# Patient Record
Sex: FEMALE | Race: ASIAN | NOT HISPANIC OR LATINO
[De-identification: names, ages, dates, MRNs, and addresses within clinical notes are randomized per-mention and may not be internally consistent; named-entity substitution may affect disease eponyms.]

---

## 2018-12-26 ENCOUNTER — TRANSCRIPTION ENCOUNTER (OUTPATIENT)
Age: 53
End: 2018-12-26

## 2019-03-07 ENCOUNTER — TRANSCRIPTION ENCOUNTER (OUTPATIENT)
Age: 54
End: 2019-03-07

## 2019-03-22 ENCOUNTER — APPOINTMENT (OUTPATIENT)
Dept: GASTROENTEROLOGY | Facility: CLINIC | Age: 54
End: 2019-03-22
Payer: MEDICAID

## 2019-03-22 VITALS
SYSTOLIC BLOOD PRESSURE: 121 MMHG | HEIGHT: 63 IN | TEMPERATURE: 97.9 F | BODY MASS INDEX: 23.04 KG/M2 | DIASTOLIC BLOOD PRESSURE: 80 MMHG | WEIGHT: 130 LBS | HEART RATE: 56 BPM | OXYGEN SATURATION: 96 %

## 2019-03-22 DIAGNOSIS — Z00.00 ENCOUNTER FOR GENERAL ADULT MEDICAL EXAMINATION W/OUT ABNORMAL FINDINGS: ICD-10-CM

## 2019-03-22 DIAGNOSIS — Z84.89 FAMILY HISTORY OF OTHER SPECIFIED CONDITIONS: ICD-10-CM

## 2019-03-22 DIAGNOSIS — Z12.11 ENCOUNTER FOR SCREENING FOR MALIGNANT NEOPLASM OF COLON: ICD-10-CM

## 2019-03-22 PROCEDURE — 99204 OFFICE O/P NEW MOD 45 MIN: CPT

## 2019-03-23 LAB
ALBUMIN SERPL ELPH-MCNC: 4.5 G/DL
ALP BLD-CCNC: 63 U/L
ALT SERPL-CCNC: 18 U/L
ANION GAP SERPL CALC-SCNC: 10 MMOL/L
AST SERPL-CCNC: 21 U/L
BASOPHILS # BLD AUTO: 0.03 K/UL
BASOPHILS NFR BLD AUTO: 0.8 %
BILIRUB SERPL-MCNC: 0.3 MG/DL
BUN SERPL-MCNC: 20 MG/DL
CALCIUM SERPL-MCNC: 9.4 MG/DL
CHLORIDE SERPL-SCNC: 105 MMOL/L
CO2 SERPL-SCNC: 26 MMOL/L
CREAT SERPL-MCNC: 0.72 MG/DL
EOSINOPHIL # BLD AUTO: 0.13 K/UL
EOSINOPHIL NFR BLD AUTO: 3.4 %
GLUCOSE SERPL-MCNC: 87 MG/DL
HCT VFR BLD CALC: 41.9 %
HGB BLD-MCNC: 13.2 G/DL
IMM GRANULOCYTES NFR BLD AUTO: 0.3 %
LYMPHOCYTES # BLD AUTO: 1.12 K/UL
LYMPHOCYTES NFR BLD AUTO: 29.3 %
MAN DIFF?: NORMAL
MCHC RBC-ENTMCNC: 27.6 PG
MCHC RBC-ENTMCNC: 31.5 GM/DL
MCV RBC AUTO: 87.5 FL
MONOCYTES # BLD AUTO: 0.24 K/UL
MONOCYTES NFR BLD AUTO: 6.3 %
NEUTROPHILS # BLD AUTO: 2.29 K/UL
NEUTROPHILS NFR BLD AUTO: 59.9 %
PLATELET # BLD AUTO: 337 K/UL
POTASSIUM SERPL-SCNC: 4.8 MMOL/L
PROT SERPL-MCNC: 6.9 G/DL
RBC # BLD: 4.79 M/UL
RBC # FLD: 13.8 %
SODIUM SERPL-SCNC: 141 MMOL/L
WBC # FLD AUTO: 3.82 K/UL

## 2019-03-28 ENCOUNTER — APPOINTMENT (OUTPATIENT)
Dept: ULTRASOUND IMAGING | Facility: CLINIC | Age: 54
End: 2019-03-28
Payer: MEDICAID

## 2019-03-28 ENCOUNTER — OUTPATIENT (OUTPATIENT)
Dept: OUTPATIENT SERVICES | Facility: HOSPITAL | Age: 54
LOS: 1 days | End: 2019-03-28

## 2019-03-28 PROCEDURE — 76700 US EXAM ABDOM COMPLETE: CPT | Mod: 26

## 2019-04-02 ENCOUNTER — RESULT REVIEW (OUTPATIENT)
Age: 54
End: 2019-04-02

## 2019-04-02 ENCOUNTER — OUTPATIENT (OUTPATIENT)
Dept: OUTPATIENT SERVICES | Facility: HOSPITAL | Age: 54
LOS: 1 days | Discharge: HOME | End: 2019-04-02
Payer: MEDICAID

## 2019-04-02 PROCEDURE — 43239 EGD BIOPSY SINGLE/MULTIPLE: CPT

## 2019-04-02 PROCEDURE — 88305 TISSUE EXAM BY PATHOLOGIST: CPT

## 2019-04-02 PROCEDURE — 45385 COLONOSCOPY W/LESION REMOVAL: CPT | Mod: PT

## 2019-04-02 PROCEDURE — 45385 COLONOSCOPY W/LESION REMOVAL: CPT

## 2019-04-02 PROCEDURE — 88342 IMHCHEM/IMCYTCHM 1ST ANTB: CPT

## 2019-04-04 LAB — SURGICAL PATHOLOGY STUDY: SIGNIFICANT CHANGE UP

## 2019-04-28 ENCOUNTER — MOBILE ON CALL (OUTPATIENT)
Age: 54
End: 2019-04-28

## 2019-04-28 LAB — UREA BREATH TEST QL: NEGATIVE

## 2019-11-05 ENCOUNTER — TRANSCRIPTION ENCOUNTER (OUTPATIENT)
Age: 54
End: 2019-11-05

## 2019-11-14 ENCOUNTER — APPOINTMENT (OUTPATIENT)
Dept: MAMMOGRAPHY | Facility: CLINIC | Age: 54
End: 2019-11-14
Payer: MEDICAID

## 2019-11-14 ENCOUNTER — OUTPATIENT (OUTPATIENT)
Dept: OUTPATIENT SERVICES | Facility: HOSPITAL | Age: 54
LOS: 1 days | End: 2019-11-14

## 2019-11-14 ENCOUNTER — APPOINTMENT (OUTPATIENT)
Dept: ULTRASOUND IMAGING | Facility: CLINIC | Age: 54
End: 2019-11-14
Payer: MEDICAID

## 2019-11-14 PROCEDURE — 76641 ULTRASOUND BREAST COMPLETE: CPT | Mod: 26,50

## 2019-11-14 PROCEDURE — 77066 DX MAMMO INCL CAD BI: CPT | Mod: 26,GG

## 2019-11-14 PROCEDURE — 77067 SCR MAMMO BI INCL CAD: CPT | Mod: 26,59

## 2019-11-14 PROCEDURE — 76830 TRANSVAGINAL US NON-OB: CPT | Mod: 26

## 2019-11-14 PROCEDURE — 76856 US EXAM PELVIC COMPLETE: CPT | Mod: 26

## 2019-11-14 PROCEDURE — 77063 BREAST TOMOSYNTHESIS BI: CPT | Mod: 26,59

## 2019-11-25 ENCOUNTER — TRANSCRIPTION ENCOUNTER (OUTPATIENT)
Age: 54
End: 2019-11-25

## 2019-12-01 ENCOUNTER — FORM ENCOUNTER (OUTPATIENT)
Age: 54
End: 2019-12-01

## 2019-12-02 ENCOUNTER — APPOINTMENT (OUTPATIENT)
Dept: PHYSICAL MEDICINE AND REHAB | Facility: CLINIC | Age: 54
End: 2019-12-02
Payer: MEDICAID

## 2019-12-02 ENCOUNTER — APPOINTMENT (OUTPATIENT)
Dept: RADIOLOGY | Facility: CLINIC | Age: 54
End: 2019-12-02
Payer: MEDICAID

## 2019-12-02 ENCOUNTER — OUTPATIENT (OUTPATIENT)
Dept: OUTPATIENT SERVICES | Facility: HOSPITAL | Age: 54
LOS: 1 days | End: 2019-12-02

## 2019-12-02 ENCOUNTER — APPOINTMENT (OUTPATIENT)
Dept: ORTHOPEDIC SURGERY | Facility: CLINIC | Age: 54
End: 2019-12-02

## 2019-12-02 VITALS
RESPIRATION RATE: 16 BRPM | HEART RATE: 64 BPM | WEIGHT: 130 LBS | HEIGHT: 63 IN | OXYGEN SATURATION: 97 % | BODY MASS INDEX: 23.04 KG/M2

## 2019-12-02 DIAGNOSIS — Z80.8 FAMILY HISTORY OF MALIGNANT NEOPLASM OF OTHER ORGANS OR SYSTEMS: ICD-10-CM

## 2019-12-02 DIAGNOSIS — Z80.0 FAMILY HISTORY OF MALIGNANT NEOPLASM OF DIGESTIVE ORGANS: ICD-10-CM

## 2019-12-02 DIAGNOSIS — Z80.52 FAMILY HISTORY OF MALIGNANT NEOPLASM OF BLADDER: ICD-10-CM

## 2019-12-02 PROCEDURE — 99204 OFFICE O/P NEW MOD 45 MIN: CPT

## 2019-12-02 PROCEDURE — 72040 X-RAY EXAM NECK SPINE 2-3 VW: CPT | Mod: 26

## 2019-12-02 RX ORDER — METHYLPREDNISOLONE 4 MG/1
4 TABLET ORAL
Qty: 30 | Refills: 0 | Status: ACTIVE | COMMUNITY
Start: 2019-12-02 | End: 1900-01-01

## 2019-12-02 RX ORDER — PANTOPRAZOLE 40 MG/1
40 TABLET, DELAYED RELEASE ORAL DAILY
Qty: 30 | Refills: 5 | Status: DISCONTINUED | COMMUNITY
Start: 2019-03-22 | End: 2019-12-02

## 2019-12-02 RX ORDER — IBUPROFEN 800 MG/1
800 TABLET, FILM COATED ORAL
Refills: 0 | Status: ACTIVE | COMMUNITY

## 2019-12-11 ENCOUNTER — APPOINTMENT (OUTPATIENT)
Dept: GASTROENTEROLOGY | Facility: CLINIC | Age: 54
End: 2019-12-11
Payer: MEDICAID

## 2019-12-11 VITALS
BODY MASS INDEX: 23.06 KG/M2 | HEART RATE: 79 BPM | HEIGHT: 63 IN | TEMPERATURE: 97.7 F | DIASTOLIC BLOOD PRESSURE: 81 MMHG | OXYGEN SATURATION: 97 % | WEIGHT: 130.13 LBS | SYSTOLIC BLOOD PRESSURE: 129 MMHG

## 2019-12-11 PROCEDURE — 99214 OFFICE O/P EST MOD 30 MIN: CPT

## 2019-12-11 NOTE — PHYSICAL EXAM
[General Appearance - Alert] : alert [Sclera] : the sclera and conjunctiva were normal [Outer Ear] : the ears and nose were normal in appearance [Apical Impulse] : the apical impulse was normal [Neck Appearance] : the appearance of the neck was normal [Bowel Sounds] : normal bowel sounds [Abdomen Soft] : soft [Abdomen Tenderness] : non-tender [Abdomen Hernia] : no hernia was discovered [Abdomen Mass (___ Cm)] : no abdominal mass palpated [] : no hepato-splenomegaly [Abnormal Walk] : normal gait [Oriented To Time, Place, And Person] : oriented to person, place, and time

## 2019-12-11 NOTE — ASSESSMENT
[FreeTextEntry1] : 53 yr old female seen in GI clinic for epigastric pain x 2 months.\par \par 1.Epigastric pain\par - resolved\par - can use pepcis prn\par \par 2. Family h/o colon cancer, personal h/o colon polyps\par  - repeat colonoscopy in 2024\par \par 3. ?Sun's esophagus\par - will repeat EGD with distal esophagus biopsies.\par \par f/u in 3 months

## 2019-12-11 NOTE — HISTORY OF PRESENT ILLNESS
[FreeTextEntry1] : 3/22/19 Initial visit\par 53 yr old female presenting to the gI clinic for evaluation of abdominal pain ongoing for 2 months for which she was seen at Urgent care and started on zantac with some relief. Abd pain happens mostly after eating food , no radiation not related to BMs. Denies any weight loss, GI bleeding etc. No other GI symptoms at current visit.\par \par Colon cancer in her father at age 81.\par \par 12/11/19\par Abdominal pain much improved. Off the PPI now. EGD was suspicion for an [place] of [place]'s esophagus but biopsies did not show any intestinal metaplasia. No other GI symptoms.\par \par DATA REVIEWED\par Last EGD April 2019 ?[place], path -ve for IM. Repeat in 1 year\par Last colonoscopy April 2019 1 polyp removed. Repeat in 5 years\par \par \par Imaging\par [facility] March 2019\par s/p cholecystectomy. Normal.

## 2019-12-12 ENCOUNTER — APPOINTMENT (OUTPATIENT)
Dept: NEUROLOGY | Facility: CLINIC | Age: 54
End: 2019-12-12
Payer: MEDICAID

## 2019-12-12 VITALS
SYSTOLIC BLOOD PRESSURE: 106 MMHG | HEIGHT: 63 IN | BODY MASS INDEX: 22.86 KG/M2 | OXYGEN SATURATION: 97 % | DIASTOLIC BLOOD PRESSURE: 66 MMHG | HEART RATE: 81 BPM | WEIGHT: 129 LBS

## 2019-12-12 PROCEDURE — 99203 OFFICE O/P NEW LOW 30 MIN: CPT

## 2019-12-12 NOTE — DISCUSSION/SUMMARY
[FreeTextEntry1] : 54-year-old woman with right C6 radiculopathy.\par \par # Radiculopathy\par -Agree with MRI C spine, will try to expedite\par -Physical therapy\par -Depending on MRI findings will consider referral for steroid injection (has already seen Dr. Rankin) or spine surgery\par \par RTC 2 months.

## 2019-12-12 NOTE — DATA REVIEWED
[de-identified] : PMR notes reviewed\par X ray C spine reviewed, notable for disk space narrowing at C5-C6\par 
31-Oct-2019 06:00

## 2019-12-12 NOTE — PHYSICAL EXAM
[FreeTextEntry1] : Physical Exam\par Constitutional: no apparent distress\par Psychiatric: normal affect, euthyhmic, alert and oriented x 3\par HEENT: moist mucous membranes, oropharynx clear\par Neck: supple, no lymphadenopathy\par Respiratory: clear to auscultation bilaterally, no crackles or wheezing\par Cardiovascular: regular rate and rhythm, normal S1/S2, no murmurs, no edema\par GI: soft, non-tender, no distended, bowel sounds present; no hepatosplenomegaly on palpation\par Musculoskeletal: extremities warm, well-perfused, normal range of motion\par Skin: no rashes, bruises, or lesions\par \par Neurologic Exam:\par Mental Status: awake and alert, oriented to time, place, and person, attention intact, memory intact, speech fluent and prosodic with no paraphasic errors, follows simple and complex commands, normal fund of knowledge\par Cranial Nerves: I: deferred; II: pupils equal round and reactive, visual fields full to confrontation; III, IV, VI: extraocular movements full with no nystagmus; V: facial sensation intact and symmetric; VII: facial power symmetric; VIII: hearing intact to finger rub; IX/X: palate elevates symmetrically, no dysarthria; XI: shoulder shrug symmetric; XII: tongue protrudes midline\par Motor: normal bulk and tone, no orbiting or pronator drift, power 5/5 to confrontation throughout including shoulder abduction, elbow flexion and extension, wrist flexion and extension, hip flexion, knee flexion and extension, plantarflexion, and dorsiflexion, with the exception of right thumb (APB and OP) which were 4/5\par Sensation: decreased to light touch in right thumb and index finger\par Coordination: finger-nose-finger intact bilaterally\par Reflexes: 1+ R biceps, 2+ L biceps, triceps, brachioradialis, patella.  Toes downgoing bilaterally, no clonus\par Gait: narrow base, normal stance and stride, normal arm swing

## 2019-12-12 NOTE — HISTORY OF PRESENT ILLNESS
[FreeTextEntry1] : 54-year-old right-handed woman who presents for evaluation of right thumb numbness and weakness.\par \par About a month ago she developed pain in her neck and the back of her right shoulder along with numbness and tingling in her right thumb.  Also feels like her thumb is weak and that she cannot use her hand normally.  She saw Dr. Rankin from [facility] on 12/2 who diagnosed right C6 radiculopathy, ordered MRI C spine, and prescribed Medrol dose pack.  She reports that the steroids helped with her neck and shoulder pain (which have resolved), but did not affect the numbness and weakness in her right thumb.  No issues with right upper extremity or either lower extremity.  No history of neck injury.

## 2019-12-16 ENCOUNTER — APPOINTMENT (OUTPATIENT)
Dept: PHYSICAL MEDICINE AND REHAB | Facility: CLINIC | Age: 54
End: 2019-12-16

## 2019-12-18 ENCOUNTER — APPOINTMENT (OUTPATIENT)
Dept: MRI IMAGING | Facility: CLINIC | Age: 54
End: 2019-12-18
Payer: MEDICAID

## 2019-12-18 ENCOUNTER — OUTPATIENT (OUTPATIENT)
Dept: OUTPATIENT SERVICES | Facility: HOSPITAL | Age: 54
LOS: 1 days | End: 2019-12-18

## 2019-12-18 PROCEDURE — 72141 MRI NECK SPINE W/O DYE: CPT | Mod: 26

## 2019-12-24 ENCOUNTER — RESULT REVIEW (OUTPATIENT)
Age: 54
End: 2019-12-24

## 2019-12-24 ENCOUNTER — OUTPATIENT (OUTPATIENT)
Dept: OUTPATIENT SERVICES | Facility: HOSPITAL | Age: 54
LOS: 1 days | Discharge: HOME | End: 2019-12-24
Payer: MEDICAID

## 2019-12-24 ENCOUNTER — APPOINTMENT (OUTPATIENT)
Dept: GASTROENTEROLOGY | Facility: HOSPITAL | Age: 54
End: 2019-12-24

## 2019-12-24 PROCEDURE — 43239 EGD BIOPSY SINGLE/MULTIPLE: CPT

## 2019-12-24 PROCEDURE — 88305 TISSUE EXAM BY PATHOLOGIST: CPT

## 2019-12-24 PROCEDURE — 88305 TISSUE EXAM BY PATHOLOGIST: CPT | Mod: 26

## 2019-12-26 LAB — SURGICAL PATHOLOGY STUDY: SIGNIFICANT CHANGE UP

## 2019-12-30 ENCOUNTER — APPOINTMENT (OUTPATIENT)
Dept: PHYSICAL MEDICINE AND REHAB | Facility: CLINIC | Age: 54
End: 2019-12-30
Payer: MEDICAID

## 2019-12-30 VITALS
HEIGHT: 63 IN | HEART RATE: 62 BPM | OXYGEN SATURATION: 97 % | WEIGHT: 129 LBS | BODY MASS INDEX: 22.86 KG/M2 | RESPIRATION RATE: 16 BRPM

## 2019-12-30 PROCEDURE — 99214 OFFICE O/P EST MOD 30 MIN: CPT

## 2020-01-14 ENCOUNTER — APPOINTMENT (OUTPATIENT)
Dept: ULTRASOUND IMAGING | Facility: CLINIC | Age: 55
End: 2020-01-14
Payer: MEDICAID

## 2020-01-14 ENCOUNTER — OUTPATIENT (OUTPATIENT)
Dept: OUTPATIENT SERVICES | Facility: HOSPITAL | Age: 55
LOS: 1 days | End: 2020-01-14

## 2020-01-14 ENCOUNTER — APPOINTMENT (OUTPATIENT)
Dept: PHYSICAL MEDICINE AND REHAB | Facility: CLINIC | Age: 55
End: 2020-01-14
Payer: MEDICAID

## 2020-01-14 DIAGNOSIS — M54.12 RADICULOPATHY, CERVICAL REGION: ICD-10-CM

## 2020-01-14 PROCEDURE — 76856 US EXAM PELVIC COMPLETE: CPT | Mod: 26

## 2020-01-14 PROCEDURE — 62321 NJX INTERLAMINAR CRV/THRC: CPT

## 2020-03-13 ENCOUNTER — APPOINTMENT (OUTPATIENT)
Dept: GASTROENTEROLOGY | Facility: CLINIC | Age: 55
End: 2020-03-13
Payer: MEDICAID

## 2020-03-13 VITALS
BODY MASS INDEX: 23.04 KG/M2 | HEART RATE: 63 BPM | HEIGHT: 63 IN | SYSTOLIC BLOOD PRESSURE: 109 MMHG | WEIGHT: 130 LBS | DIASTOLIC BLOOD PRESSURE: 68 MMHG | OXYGEN SATURATION: 93 % | TEMPERATURE: 98.8 F

## 2020-03-13 PROCEDURE — 99214 OFFICE O/P EST MOD 30 MIN: CPT

## 2020-10-23 ENCOUNTER — APPOINTMENT (OUTPATIENT)
Dept: GASTROENTEROLOGY | Facility: CLINIC | Age: 55
End: 2020-10-23
Payer: MEDICAID

## 2020-10-23 VITALS
HEART RATE: 60 BPM | WEIGHT: 130 LBS | DIASTOLIC BLOOD PRESSURE: 65 MMHG | SYSTOLIC BLOOD PRESSURE: 113 MMHG | BODY MASS INDEX: 23.04 KG/M2 | HEIGHT: 63 IN | OXYGEN SATURATION: 96 % | TEMPERATURE: 97.6 F

## 2020-10-23 DIAGNOSIS — Z87.19 PERSONAL HISTORY OF OTHER DISEASES OF THE DIGESTIVE SYSTEM: ICD-10-CM

## 2020-10-23 DIAGNOSIS — R10.13 EPIGASTRIC PAIN: ICD-10-CM

## 2020-10-23 PROCEDURE — 99072 ADDL SUPL MATRL&STAF TM PHE: CPT

## 2020-10-23 PROCEDURE — 99214 OFFICE O/P EST MOD 30 MIN: CPT | Mod: 25

## 2022-10-16 ENCOUNTER — NON-APPOINTMENT (OUTPATIENT)
Age: 57
End: 2022-10-16

## 2022-10-28 ENCOUNTER — NON-APPOINTMENT (OUTPATIENT)
Age: 57
End: 2022-10-28

## 2022-10-29 ENCOUNTER — EMERGENCY (EMERGENCY)
Facility: HOSPITAL | Age: 57
LOS: 1 days | Discharge: HOME | End: 2022-10-29
Attending: EMERGENCY MEDICINE | Admitting: EMERGENCY MEDICINE

## 2022-10-29 VITALS
SYSTOLIC BLOOD PRESSURE: 111 MMHG | TEMPERATURE: 99 F | HEART RATE: 68 BPM | WEIGHT: 130.07 LBS | RESPIRATION RATE: 18 BRPM | OXYGEN SATURATION: 97 % | DIASTOLIC BLOOD PRESSURE: 74 MMHG

## 2022-10-29 PROCEDURE — 99284 EMERGENCY DEPT VISIT MOD MDM: CPT

## 2022-10-29 NOTE — ED PROVIDER NOTE - PHYSICAL EXAMINATION
VITAL SIGNS: I have reviewed nursing notes and confirm.  CONSTITUTIONAL: Well-developed; well-nourished; in no apparent distress, breathing comfortably and speaking in full sentances.  SKIN: Skin is warm and dry, no acute rash.  HEAD: Normocephalic; atraumatic.  EYES: PERRL, EOM intact; conjunctiva and sclera clear.  ENT: No nasal discharge; airway clear.  NECK: Supple; non tender.  CARD: S1, S2 normal; no murmurs, gallops, or rubs. Regular rate and rhythm.  RESP: No wheezes, rales or rhonchi.  ABD: Normal bowel sounds; soft; non-distended; non-tender; no hepatosplenomegaly.  MSK: Normal ROM. No clubbing, cyanosis or edema.  NEURO: Alert, oriented. Grossly unremarkable.  PSYCH: Cooperative, appropriate.

## 2022-10-29 NOTE — ED PROVIDER NOTE - CLINICAL SUMMARY MEDICAL DECISION MAKING FREE TEXT BOX
Post-covid sxs.  Evaluation here is WNL and pt is requesting discharge.  Offered further investigation but pt declines at this time.  Strict return precautions discussed.

## 2022-10-29 NOTE — ED PROVIDER NOTE - PATIENT PORTAL LINK FT
You can access the FollowMyHealth Patient Portal offered by [facility] by registering at the following website: [email or web address]. By joining [facility]’s FollowMyHealth portal, you will also be able to view your health information using other applications (apps) compatible with our system.

## 2022-10-29 NOTE — ED ADULT TRIAGE NOTE - CHIEF COMPLAINT QUOTE
Pt complaining of fatigue and sob with exertion. Pt diagnosed with covid 10 days ago. PT sent in from urgent care for hypotension pt normotensive in triage. Pt tested negative for Covid today.

## 2022-10-29 NOTE — ED PROVIDER NOTE - OBJECTIVE STATEMENT
Patient is a 57-year-old female with a history of thyroid cancer (status postsurgery and in remission) who presents with episode of low blood pressure while on an outside urgent care today.  Patient reports she was diagnosed with COVID 10 days ago.  She reports cough and shortness of breath throughout illness.  She initially improved and reports a negative test 5 days ago but then shortly after that had recurrence of cough and shortness of breath.  She reports she actually feels better today but she went to an urgent care to see if her test was still negative.  She had the [facility] city MD and it came back negative but they noticed that her blood pressure was low so instructed her to go to the emergency department.  Here the patient's blood pressure is normal and she is without further complaint.  She requests discharge shortly after evaluation.

## 2022-10-31 DIAGNOSIS — C73 MALIGNANT NEOPLASM OF THYROID GLAND: ICD-10-CM

## 2022-10-31 DIAGNOSIS — U09.9 POST COVID-19 CONDITION, UNSPECIFIED: ICD-10-CM

## 2022-10-31 DIAGNOSIS — R05.1 ACUTE COUGH: ICD-10-CM

## 2025-04-16 ENCOUNTER — NON-APPOINTMENT (OUTPATIENT)
Age: 60
End: 2025-04-16

## 2025-04-16 ENCOUNTER — EMERGENCY (EMERGENCY)
Age: 60
LOS: 1 days | End: 2025-04-16
Attending: STUDENT IN AN ORGANIZED HEALTH CARE EDUCATION/TRAINING PROGRAM | Admitting: EMERGENCY MEDICINE
Payer: MEDICAID

## 2025-04-16 VITALS
HEART RATE: 63 BPM | SYSTOLIC BLOOD PRESSURE: 128 MMHG | OXYGEN SATURATION: 95 % | HEIGHT: 63 IN | DIASTOLIC BLOOD PRESSURE: 83 MMHG | WEIGHT: 139.99 LBS | TEMPERATURE: 98 F | RESPIRATION RATE: 18 BRPM

## 2025-04-16 DIAGNOSIS — E03.9 HYPOTHYROIDISM, UNSPECIFIED: ICD-10-CM

## 2025-04-16 DIAGNOSIS — Z85.850 PERSONAL HISTORY OF MALIGNANT NEOPLASM OF THYROID: ICD-10-CM

## 2025-04-16 DIAGNOSIS — S00.83XA CONTUSION OF OTHER PART OF HEAD, INITIAL ENCOUNTER: ICD-10-CM
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
## 2025-04-16 DIAGNOSIS — R51.9 HEADACHE, UNSPECIFIED: ICD-10-CM

## 2025-04-16 PROBLEM — C73 MALIGNANT NEOPLASM OF THYROID GLAND: Chronic | Status: ACTIVE | Noted: 2022-10-29

## 2025-04-16 PROCEDURE — 70450 CT HEAD/BRAIN W/O DYE: CPT | Mod: 26

## 2025-04-16 PROCEDURE — 99284 EMERGENCY DEPT VISIT MOD MDM: CPT

## 2025-04-16 PROCEDURE — 73060 X-RAY EXAM OF HUMERUS: CPT | Mod: 26,RT

## 2025-04-16 PROCEDURE — 71250 CT THORAX DX C-: CPT | Mod: 26

## 2025-04-16 PROCEDURE — 70486 CT MAXILLOFACIAL W/O DYE: CPT | Mod: 26

## 2025-04-16 PROCEDURE — 73030 X-RAY EXAM OF SHOULDER: CPT | Mod: 26,RT

## 2025-04-16 PROCEDURE — 72125 CT NECK SPINE W/O DYE: CPT | Mod: 26

## 2025-04-16 RX ORDER — ACETAMINOPHEN 500 MG/5ML
975 LIQUID (ML) ORAL ONCE
Refills: 0 | Status: COMPLETED | OUTPATIENT
Start: 2025-04-16 | End: 2025-04-16